# Patient Record
Sex: FEMALE | Race: BLACK OR AFRICAN AMERICAN | NOT HISPANIC OR LATINO | Employment: UNEMPLOYED | URBAN - NONMETROPOLITAN AREA
[De-identification: names, ages, dates, MRNs, and addresses within clinical notes are randomized per-mention and may not be internally consistent; named-entity substitution may affect disease eponyms.]

---

## 2021-05-18 LAB
BASOPHILS # BLD AUTO: 0.01 X10(3)/MCL (ref 0.01–0.08)
BASOPHILS NFR BLD AUTO: 0.3 % (ref 0.1–1.2)
BASOPHILS NFR BLD MANUAL: 0 % (ref 0–2)
BILIRUB SERPL-MCNC: NEGATIVE MG/DL
BLOOD URINE, POC: NEGATIVE
CLARITY, POC UA: CLEAR
COLOR, POC UA: YELLOW
EOSINOPHIL # BLD AUTO: 0.03 X10(3)/MCL (ref 0.04–0.36)
EOSINOPHIL NFR BLD AUTO: 0.8 % (ref 0.7–7)
EOSINOPHIL NFR BLD MANUAL: 0 % (ref 0–3)
ERYTHROCYTE [DISTWIDTH] IN BLOOD BY AUTOMATED COUNT: 14.2 % (ref 11–14.5)
GLUCOSE UR QL STRIP: NEGATIVE
GRANULOCYTES NFR BLD MANUAL: 51 % (ref 37–73)
HBV SURFACE AG SERPL QL IA: NEGATIVE
HCT VFR BLD AUTO: 36.2 % (ref 36–48)
HGB BLD-MCNC: 11.7 G/DL (ref 11.8–16)
HIV 1+2 AB+HIV1 P24 AG SERPL QL IA: NORMAL
IMM GRANULOCYTES # BLD AUTO: 0 X10E3/UL (ref 0–0.03)
IMM GRANULOCYTES NFR BLD AUTO: 0 % (ref 0–0.5)
KETONES UR QL STRIP: NEGATIVE
LEUKOCYTE EST, POC UA: NORMAL
LYMPHOCYTES # BLD AUTO: 1.67 X10(3)/MCL (ref 1.16–3.74)
LYMPHOCYTES NFR BLD AUTO: 42.3 % (ref 20–55)
LYMPHOCYTES NFR BLD MANUAL: 47 % (ref 20–55)
MCH RBC QN AUTO: 29.1 PG (ref 27–34)
MCHC RBC AUTO-ENTMCNC: 32.3 G/DL (ref 31–37)
MCV RBC AUTO: 90 FL (ref 79–99)
METAMYELOCYTES NFR BLD MANUAL: 0 % (ref 0–1)
MONOCYTES # BLD AUTO: 0.26 X10(3)/MCL (ref 0.24–0.36)
MONOCYTES NFR BLD AUTO: 6.6 % (ref 4.7–12.5)
MONOCYTES NFR BLD MANUAL: 2 % (ref 0–10)
MYELOCYTES NFR BLD MANUAL: 0 %
NEUTROPHILS # BLD AUTO: 1.98 X10(3)/MCL (ref 1.56–6.13)
NEUTROPHILS NFR BLD AUTO: 50 % (ref 37–73)
NEUTS BAND NFR BLD MANUAL: 0 % (ref 0–5)
NITRITE, POC UA: NEGATIVE
NRBC BLD MANUAL-RTO: 0 % (ref 0–1)
PH, POC UA: 7
PLATELET # BLD AUTO: 313 X10(3)/MCL (ref 140–371)
PMV BLD AUTO: 10.1 FL (ref 9.4–12.4)
POC BETA-HCG (QUAL): POSITIVE
PROTEIN, POC: NEGATIVE
RBC # BLD AUTO: 4.02 X10(6)/MCL (ref 4–5.1)
RPR SER QL: NORMAL
SPECIFIC GRAVITY, POC UA: 1.02
UROBILINOGEN, POC UA: NORMAL
WBC # SPEC AUTO: 4 X10(3)/MCL (ref 4–11.5)

## 2021-05-19 LAB
C TRACH DNA SPEC QL NAA+PROBE: NEGATIVE
N GONNORRH DNA PROBE W/RFLX: NEGATIVE
PAP RECOMMENDATION EXT: NORMAL

## 2021-06-15 LAB
CLARITY, POC UA: CLEAR
COLOR, POC UA: YELLOW
GLUCOSE UR QL STRIP: NEGATIVE
LEUKOCYTE EST, POC UA: NEGATIVE
NITRITE, POC UA: NEGATIVE
PROTEIN, POC: NEGATIVE

## 2021-08-18 ENCOUNTER — HISTORICAL (OUTPATIENT)
Dept: ADMINISTRATIVE | Facility: HOSPITAL | Age: 26
End: 2021-08-18

## 2021-08-27 LAB
BILIRUB SERPL-MCNC: NEGATIVE MG/DL
BLOOD URINE, POC: NEGATIVE
CLARITY, POC UA: CLEAR
COLOR, POC UA: YELLOW
GLUCOSE UR QL STRIP: NEGATIVE
KETONES UR QL STRIP: NEGATIVE
LEUKOCYTE EST, POC UA: NEGATIVE
NITRITE, POC UA: NEGATIVE
PH, POC UA: 7
PROTEIN, POC: NEGATIVE
SPECIFIC GRAVITY, POC UA: 1.02
UROBILINOGEN, POC UA: NORMAL

## 2021-09-22 LAB — GLUCOSE 1H P 100 G GLC PO SERPL-MCNC: 104 MG/DL (ref 70–140)

## 2021-10-19 LAB
CLARITY, POC UA: CLEAR
COLOR, POC UA: YELLOW
ERYTHROCYTE [DISTWIDTH] IN BLOOD BY AUTOMATED COUNT: 13.1 % (ref 11–14.5)
GLUCOSE UR QL STRIP: NEGATIVE
HCT VFR BLD AUTO: 28 % (ref 36–48)
HGB BLD-MCNC: 9.4 G/DL (ref 11.8–16)
LEUKOCYTE EST, POC UA: NEGATIVE
MCH RBC QN AUTO: 30.8 PG (ref 27–34)
MCHC RBC AUTO-ENTMCNC: 33.6 G/DL (ref 31–37)
MCV RBC AUTO: 91.8 FL (ref 79–99)
NITRITE, POC UA: NEGATIVE
PLATELET # BLD AUTO: 291 X10(3)/MCL (ref 140–371)
PMV BLD AUTO: 9.5 FL (ref 9.4–12.4)
PROTEIN, POC: NEGATIVE
RBC # BLD AUTO: 3.05 X10(6)/MCL (ref 4–5.1)
RPR SER QL: NORMAL
WBC # SPEC AUTO: 6.9 X10(3)/MCL (ref 4–11.5)

## 2021-11-30 LAB
CLARITY, POC UA: CLEAR
COLOR, POC UA: NORMAL
ERYTHROCYTE [DISTWIDTH] IN BLOOD BY AUTOMATED COUNT: 13.5 % (ref 11–14.5)
GLUCOSE UR QL STRIP: NEGATIVE
HCT VFR BLD AUTO: 29.2 % (ref 36–48)
HGB BLD-MCNC: 9.7 G/DL (ref 11.8–16)
LEUKOCYTE EST, POC UA: NEGATIVE
MCH RBC QN AUTO: 30.8 PG (ref 27–34)
MCHC RBC AUTO-ENTMCNC: 33.2 G/DL (ref 31–37)
MCV RBC AUTO: 92.7 FL (ref 79–99)
NITRITE, POC UA: NEGATIVE
PLATELET # BLD AUTO: 238 10*3/UL (ref 140–371)
PMV BLD AUTO: 9.3 FL (ref 9.4–12.4)
PROTEIN, POC: NEGATIVE
RBC # BLD AUTO: 3.15 10*6/UL (ref 4–5.1)
WBC # SPEC AUTO: 5.7 10*3/UL (ref 4–11.5)

## 2022-04-10 ENCOUNTER — HISTORICAL (OUTPATIENT)
Dept: ADMINISTRATIVE | Facility: HOSPITAL | Age: 27
End: 2022-04-10

## 2022-04-25 VITALS
WEIGHT: 171.31 LBS | SYSTOLIC BLOOD PRESSURE: 120 MMHG | HEIGHT: 65 IN | DIASTOLIC BLOOD PRESSURE: 68 MMHG | BODY MASS INDEX: 28.54 KG/M2

## 2022-05-03 ENCOUNTER — HISTORICAL (OUTPATIENT)
Dept: ADMINISTRATIVE | Facility: HOSPITAL | Age: 27
End: 2022-05-03

## 2022-05-03 NOTE — HISTORICAL [facility] C[facility]ER
This is a historical note converted from Reynaldo. Formatting and pictures may have been removed.  Please reference Reynaldo for original formatting and attached multimedia. Chief Complaint  New OB LMP 3/8/21.  History of Present Illness  24yo G0?female here for New OB visit. Reports LMP: 3/8/21. No h/o pap smear before. No h/o any sexually transmitted dx.  LMP/EGA/LAURA  Gestational Age (EGA) and LAURA?? ? * Note: EGA calculated as of 05/18/2021  ?  LAURA:?01/07/2022???EGA*:?6 weeks 4 days ? ? ? ? ? ?Type:?Final??Method Date:?05/18/2021  ?  ?? ? ?Method:?Ultrasound?(05/18/2021)  ?? ? ?Confirmation:?Confirmed  ?? ? ?Description:?--  ?? ? ?Comments:?--  ?? ? ?Entered by:?Calvin BERTRAND, Hillary ALMENDAREZ on 05/18/2021?  ?  Other LAURA Calculations for this Pregnancy:  ?  ?? ? ?Method:?Last Menstrual Period  ?? ? ?Method Date:?03/08/2021  ?? ? ?LAURA:?12/13/2021  ?? ? ?EGA (At Entry):?10 weeks 1 days  ?? ? ?Type:?Non-Authoritative  ?? ? ?Comments:?--  ?? ? ?Entered by:?Cecily Rowan LPN on 05/18/2021  Gynecological History  Menstrual Status Intake: Due to pregnancy  Age of Menarche: 14  STIs/STDs: No  Abnormal Pap: No  HPV Vaccine: No  Dyspareunia: No  Postcoital Bleeding: No  Dysuria: No  Additional GYN Information: PAP no history  Discharge OB: Denies  Urinary Incontinence: Denies  Sexually Active: Yes  Review of Systems  General/Constitutional:?  Chills?denies. Fatigue/weakness?denies?. Fever?denies?. Night sweats?denies?.  Skin:  Rash?denies.  Respiratory:  Cough?denies?. Hemoptysis?denies?. SOB?denies?. Sputum production?denies?. Wheezing?denies?.  Cardiovascular:  Chest pain?denies. Dizziness?denies. Palpitations?denies. Swelling in hands/feet?denies.  Breast:  Changes in skin of nipple or breast?denies?. Breast lump?denies. Breast pain?denies. Nipple discharge?denies?.  Gastrointestinal:  Abdominal pain?denies?. Blood in stool?denies?. Constipation?denies?. Diarrhea?denies?. Heartburn?denies?. Nausea?denies?.  Vomiting?deniesGenitourinary:  Incontinence?denies?. Blood in urine?denies. Frequent urination?denies?. Painful urination?denies.  Gyneocologic:  Irregular menses?denies. Heavy bleeding?denies. Painful menses?denies. Vaginal discharge/ Odor?denies?. Vaginal lesion/pain?denies. ?Pelvic pain?denies?. Decreased libido?denies. Vulvar lesion/ulcer?denies?. Prolapse of genital organs?denies?. Painful intercourse?denies?.  Menopausal:  Hot flushes?denies. Vaginal dryness?denies.  Musculoskeletal:  Joint/tamy pain?denies. Decreased ROM?denies. Muscle aches?denies. Swollen joints?denies?. Weakness?denies.  Neurologic:  Confusion?denies. Trouble walking?denies. Trouble with balance?denies?Balance difficulty?denies. Gait abnormalities?denies. Headache?denies. Tingling/Numbness?denies.  Psychiatric:  Mood lability?denies.?Anxiety or panic attacks?denies. Depressed mood?denies. Suicidal thoughts?denies.?  Physical Exam  Vitals & Measurements  T:?36.5? ?C (Temporal Artery)? BP:?118/72?  HT:?160.00?cm? WT:?62.300?kg? BMI:?24.34?  Chaperone: present  ?   General appearance: - healthy, well-nourished and well-developed  ?   Psychiatric: Orientation to time, place and person. Normal mood and affect and active, alert  ?   Skin:  Appearance: no rashes or lesions  ?   Neck:  Neck: supple, FROM, trachea midline. and no masses  Thyroid: no enlargement or nodules and non-tender.  ?  ?   Cardiovascular:  Auscultation: RRR and no murmur  Peripheral Vascular: no varicosities, LLE edema, RLE edema, calf tenderness, and palpable cord and pedal pulses intact  ?   Lungs:  Respiratory effort: no intercostal retractions or accessory muscle usage  Auscultation: no wheezing, rales/crackles, or rhonchi and clear to auscultation  ?  Breast:  Inspection/Palpation: no tenderness, discrete/distinct masses, skin changes, or abnormal secretions. Nipple appearance normal.  ?  Abdomen:  Auscultation/Inspection/Palpation: no hepatomegaly, splenomegaly, masses ,  tenderness? or CVA tenderness and soft, non distended bowel sounds preset  Hernia: no palpable hernias  ?  Female Genitalia:  Vulva: no masses,?tenderness or?lesions  Bladder/Urethra: no urethral discharge or mass, normal meatus, bladder non-distended  Vagina: no tenderness, erythema, cystocele, rectocele, abnormal vaginal discharge, or vesicle(s) or ulcers  Cervix: no discharge , no cervical lacerations noted or motion tenderness and grossly normal  Uterus: normal size and shape and midline, non-tender, and no uterine prolapse.  Adnexa/Parametria: no parametrial tenderness or mass, no adnexal tenderness or ovarian mass  ?  Lymph Nodes:  Palpation: non tender submandibular nodes, axillary nodes, or inguinal nodes  ?  Rectal Exam:  Rectum: normal perianal skin  Assessment/Plan  1.?Encounter for supervision of normal pregnancy?Z34.90  2.?6 weeks gestation of pregnancy?Z3A.01  - We discussed diet and supplements and modifiable risk factors.  Patient advised to continue moderate physical activity.  Patient will report unusual headaches, visual disturbances, pelvic pain or cramping, vaginal bleeding, rupture of membranes, extreme swelling in hands or face, diminished urine volume, any prolonged illness or infection, or persistent symptoms of labor.  ?   - Discussed with patient on?the uncertainty of?COVID 19?in relation to?pregnancy?complications with patient and unborn fetus.? Advised to CDC guidelines of social distancing ,patient to stay home as much as possible, and?limit contact with others.? Travel restrictions discussed. ?Patient to call office if she has?a temperature over 100.4?cough shortness of breath?or GI symptoms. Patient educated if?she feels she is in an emergency to call 911.?  ?   - Pap GC/CZ  - PNL  - PNV  - RTC in 4 weeks for OB check   OB History  Pregnancy History???(0,0,0,0)?? ??  ?  ??No previous pregnancies history have been recorded  Problem List/Past Medical History  Ongoing  6 weeks  gestation of pregnancy  Pregnant  Historical  No qualifying data  Medications  No active medications  Allergies  No Known Allergies  Social History  Abuse/Neglect  No, 05/18/2021  Alcohol  Never, 05/18/2021  Sexual  Sexually active: Yes. Number of current partners 1. Gender Identity Identifies as female. No, 05/18/2021  Substance Use  Never, 05/18/2021  Tobacco  Never (less than 100 in lifetime), N/A, 05/18/2021  Family History  Ovarian cancer: Negative: Mother and Sister.  Primary malignant neoplasm of breast: Negative: Mother and Sister.  Primary malignant neoplasm of colon: Negative: Mother, Father, Sister and Brother.  Primary malignant neoplasm of female genital organ: Negative: Mother and Sister.  Uterine cancer: Negative: Mother and Sister.  Health Maintenance  Health Maintenance  ???Pending?(in the next year)  ??? ??OverDue  ??? ? ? ?Alcohol Misuse Screening due??01/02/21??and every 1??year(s)  ??? ??Due?  ??? ? ? ?ADL Screening due??05/18/21??and every 1??year(s)  ??? ? ? ?Tetanus Vaccine due??05/18/21??and every 10??year(s)  ??? ??Due In Future?  ??? ? ? ?Obesity Screening not due until??01/01/22??and every 1??year(s)  ???Satisfied?(in the past 1 year)  ??? ??Satisfied?  ??? ? ? ?Blood Pressure Screening on??05/18/21.??Satisfied by Cecily Rowan LPN  ??? ? ? ?Body Mass Index Check on??05/18/21.??Satisfied by Cecily Rowan LPN  ??? ? ? ?Obesity Screening on??05/18/21.??Satisfied by Cecily Rowan LPN  ?

## 2022-05-03 NOTE — HISTORICAL [facility]
This is a historical note converted from Reynaldo. Formatting and pictures may have been removed.  Please reference Reynaldo for original formatting and attached multimedia. Chief Complaint  ob check 18w4d.  History of Present Illness  26yo BF  presents at?18w4d for routine OB check. Denies complaints. See OB card.?  LMP/EGA/LAURA  Gestational Age (EGA) and LAURA?? ? * Note: EGA calculated as of 08/10/2021  ?  LAURA:?2022???EGA*:?18 weeks 4 days ? ? ? ? ? ?Type:?Final??Method Date:?2021  ?  ?? ? ?Method:?Ultrasound?(2021)  ?? ? ?Confirmation:?Confirmed  ?? ? ?Description:?--  ?? ? ?Comments:?--  ?? ? ?Entered by:?Calvin BERTRAND, Hillary ALMENDAREZ on 2021?  ?  Other LAURA Calculations for this Pregnancy:  ?  ?? ? ?Method:?Last Menstrual Period  ?? ? ?Method Date:?2021  ?? ? ?LAURA:?2021  ?? ? ?EGA (At Entry):?10 weeks 1 days  ?? ? ?Type:?Non-Authoritative  ?? ? ?Comments:?--  ?? ? ?Entered by:?Cecily Rowan LPN on 2021  Gynecological History  Menstrual Status Intake: Due to pregnancy  Age of Menarche: 14  STIs/STDs: No  Abnormal Pap: No  HPV Vaccine: No  Dyspareunia: No  Postcoital Bleeding: No  Dysuria: No  Additional GYN Information: PAP no history  Discharge OB: Denies  Urinary Incontinence: Denies  Sexually Active: Yes  Review of Systems  General/Constitutional:  Fever?denies?.?  Skin:  Rash?denies.  Respiratory:  Cough?denies?. SOB?denies?. Wheezing?denies?.  Cardiovascular:  Chest pain?denies?. Dizziness?denies?.?Palpitations?denies?. Swelling in hands/feet?denies?.?  Gastrointestinal:  Abdominal pain?denies?. Constipation?denies?. Diarrhea?denies?. Heartburn?denies?. Nausea?denies?. Vomiting?denies?  Genitourinary:  Painful urination?denies.  Gynecologic:  Vaginal bleeding?denies?. Vaginal discharge?Normal. Leaking amniotic fluid?denies. ?Contractions?denies?  Psychiatric:  Depressed mood?denies. Suicidal thoughts?denies.?  Physical Exam  Vitals & Measurements  T:?36.5? ?C (Temporal  Artery)? BP:?120/60?  HT:?160.00?cm? WT:?65.000?kg? BMI:?25.39?  Gen: NAD  Abd: Gravid, NT  Ext: No CCE  FHT: 140?  Assessment/Plan  1.?UTI in pregnancy, antepartum?O23.40  - Urine culture(21) E Coli?  ?   ?- Asymptomatic/afebrile  ?   ?- Txed with Keflex 500 QID 21  ?   ?- Educated  ?   ?- Urine culture contaminant (21)  ?   ?- ANA M urine culture today  2.?Pregnant?Z33.1  3.?18 weeks gestation of pregnancy?Z3A.18  - Instructed on weight gain, healthy exercise, vitamins, avoidance of drugs tobacco and alcohol. Pain, fever, bleeding precautions.?  ?   - Discussed with patient travel precautions.  ?   - Discussed with patient on?the uncertainty of?COVID 19?in relation to?pregnancy?complications with patient and unborn fetus.? Advised to CDC guidelines of social distancing ,patient to stay home as much as possible, and?limit contact with others.? Travel restrictions discussed. ?Patient to call office if she has?a temperature over 100.4?cough shortness of breath?or GI symptoms. Patient educated if?she feels she is in an emergency to call 911.?  ?   - Anatomy US/Quad screen ordered  ?   - RTC in 4 weeks for OB check   OB History  Pregnancy History???(0,0,0,0)?? ??  ?  ??No previous pregnancies history have been recorded  Problem List/Past Medical History  Ongoing  Pregnant  Historical  No qualifying data  Medications  Prenatal AD oral tablet, 1 tab(s), Oral, Daily, 8 refills  Allergies  No Known Allergies  Social History  Abuse/Neglect  No, 2021  Alcohol  Never, 2021  Sexual  Sexually active: Yes. Number of current partners 1. Gender Identity Identifies as female. No, 2021  Substance Use  Never, 2021  Tobacco  Never (less than 100 in lifetime), N/A, 2021  Family History  Ovarian cancer: Negative: Mother and Sister.  Primary malignant neoplasm of breast: Negative: Mother and Sister.  Primary malignant neoplasm of colon: Negative: Mother, Father, Sister and Brother.  Primary  malignant neoplasm of female genital organ: Negative: Mother and Sister.  Uterine cancer: Negative: Mother and Sister.  Health Maintenance  Health Maintenance  ???Pending?(in the next year)  ??? ??OverDue  ??? ? ? ?Alcohol Misuse Screening due??01/02/21??and every 1??year(s)  ??? ??Due?  ??? ? ? ?ADL Screening due??08/10/21??and every 1??year(s)  ??? ? ? ?Diabetes Screening due??08/10/21??and every 3??year(s)  ??? ? ? ?Tetanus Vaccine due??08/10/21??and every 10??year(s)  ??? ??Due In Future?  ??? ? ? ?Obesity Screening not due until??01/01/22??and every 1??year(s)  ???Satisfied?(in the past 1 year)  ??? ??Satisfied?  ??? ? ? ?Blood Pressure Screening on??08/10/21.??Satisfied by Jeniffer Lei LPN  ??? ? ? ?Body Mass Index Check on??08/10/21.??Satisfied by Jeniffer Lei LPN  ??? ? ? ?Cervical Cancer Screening on??05/19/21.??Satisfied by Rakel Dial  ??? ? ? ?Depression Screening on??08/10/21.??Satisfied by Jeniffer Lei LPN  ??? ? ? ?Obesity Screening on??08/10/21.??Satisfied by Jeniffer Lei LPN  ??? ??Refused?  ??? ? ? ?Influenza Vaccine on??06/15/21.??Recorded by Cecily Rowan LPN??Reason: Patient Refuses  ?

## 2022-09-21 ENCOUNTER — HISTORICAL (OUTPATIENT)
Dept: ADMINISTRATIVE | Facility: HOSPITAL | Age: 27
End: 2022-09-21

## 2023-01-13 ENCOUNTER — DOCUMENTATION ONLY (OUTPATIENT)
Dept: ADMINISTRATIVE | Facility: HOSPITAL | Age: 28
End: 2023-01-13

## 2023-05-29 ENCOUNTER — OFFICE VISIT (OUTPATIENT)
Dept: OBSTETRICS AND GYNECOLOGY | Facility: CLINIC | Age: 28
End: 2023-05-29
Payer: COMMERCIAL

## 2023-05-29 VITALS
SYSTOLIC BLOOD PRESSURE: 110 MMHG | TEMPERATURE: 98 F | BODY MASS INDEX: 27.44 KG/M2 | WEIGHT: 164.69 LBS | DIASTOLIC BLOOD PRESSURE: 72 MMHG | HEIGHT: 65 IN

## 2023-05-29 DIAGNOSIS — Z12.4 CERVICAL CANCER SCREENING: Primary | ICD-10-CM

## 2023-05-29 DIAGNOSIS — N89.8 VAGINAL ODOR: ICD-10-CM

## 2023-05-29 DIAGNOSIS — Z97.5 IUD (INTRAUTERINE DEVICE) IN PLACE: ICD-10-CM

## 2023-05-29 DIAGNOSIS — Z01.419 WELL WOMAN EXAM: ICD-10-CM

## 2023-05-29 PROCEDURE — 3008F BODY MASS INDEX DOCD: CPT | Mod: CPTII,,, | Performed by: OBSTETRICS & GYNECOLOGY

## 2023-05-29 PROCEDURE — 1160F RVW MEDS BY RX/DR IN RCRD: CPT | Mod: CPTII,,, | Performed by: OBSTETRICS & GYNECOLOGY

## 2023-05-29 PROCEDURE — 1160F PR REVIEW ALL MEDS BY PRESCRIBER/CLIN PHARMACIST DOCUMENTED: ICD-10-PCS | Mod: CPTII,,, | Performed by: OBSTETRICS & GYNECOLOGY

## 2023-05-29 PROCEDURE — 3074F SYST BP LT 130 MM HG: CPT | Mod: CPTII,,, | Performed by: OBSTETRICS & GYNECOLOGY

## 2023-05-29 PROCEDURE — 3078F DIAST BP <80 MM HG: CPT | Mod: CPTII,,, | Performed by: OBSTETRICS & GYNECOLOGY

## 2023-05-29 PROCEDURE — 3078F PR MOST RECENT DIASTOLIC BLOOD PRESSURE < 80 MM HG: ICD-10-PCS | Mod: CPTII,,, | Performed by: OBSTETRICS & GYNECOLOGY

## 2023-05-29 PROCEDURE — 3074F PR MOST RECENT SYSTOLIC BLOOD PRESSURE < 130 MM HG: ICD-10-PCS | Mod: CPTII,,, | Performed by: OBSTETRICS & GYNECOLOGY

## 2023-05-29 PROCEDURE — 3008F PR BODY MASS INDEX (BMI) DOCUMENTED: ICD-10-PCS | Mod: CPTII,,, | Performed by: OBSTETRICS & GYNECOLOGY

## 2023-05-29 PROCEDURE — 1159F MED LIST DOCD IN RCRD: CPT | Mod: CPTII,,, | Performed by: OBSTETRICS & GYNECOLOGY

## 2023-05-29 PROCEDURE — 99395 PREV VISIT EST AGE 18-39: CPT | Mod: ,,, | Performed by: OBSTETRICS & GYNECOLOGY

## 2023-05-29 PROCEDURE — 1159F PR MEDICATION LIST DOCUMENTED IN MEDICAL RECORD: ICD-10-PCS | Mod: CPTII,,, | Performed by: OBSTETRICS & GYNECOLOGY

## 2023-05-29 PROCEDURE — 99395 PR PREVENTIVE VISIT,EST,18-39: ICD-10-PCS | Mod: ,,, | Performed by: OBSTETRICS & GYNECOLOGY

## 2023-05-29 RX ORDER — LEVONORGESTREL 52 MG/1
52 INTRAUTERINE DEVICE INTRAUTERINE
COMMUNITY
Start: 2022-06-15

## 2023-05-29 NOTE — PROGRESS NOTES
Chief Complaint:  Well Woman    History of Present Illness:  Summer Ng is a 27 y.o. year old  presents for her well woman. Currently using Mirena IUD for birth control, placed 22. Patient has monthly cycles with heavy flow lasting 8-9 days. Occasional vaginal discharge with odor. None today.      LMP: 23  Frequency: q month    Cycle Length: 8-9 days   Flow: heavy  Intermenstrual Bleeding: No  Postcoital Bleeding: No  Dysmenorrhea: Yes, moderate   Sexually Active: Yes    Dyspareunia: No  Contraception: Mirena 22 inserted    H/o STI: No   Last pap: 21 nml GC/CZ Negative   H/o Abnormal Pap: No   Colposcopy: No  Gardasil: No hx    MMG: n/a      Review of Systems:  General/Constitutional: Chills denies. Fatigue/weakness denies. Fever denies. Night sweats denies. Hot flashes denies    Respiratory: Cough denies. Hemoptysis denies. SOB denies. Sputum production denies. Wheezing denies .   Cardiovascular: Chest pain denies . Dizziness denies. Palpitations denies. Swelling in hands/feet denies    Gastrointestinal: Abdominal pain denies. Blood in stool denies. Constipation denies. Diarrhea denies. Heartburn denies. Nausea denies. Vomiting denies    Genitourinary: Incontinence denies. Blood in urine denies. Frequent urination denies. Painful urination denies. Urinary urgency denies. Nocturia denies    Gynecologic: Irregular menses denies. Heavy bleeding denies. Painful menses denies. Vaginal discharge denies. Vaginal odor denies. Vaginal itching denies. Vaginal lesion denies. Pelvic pain denies. Decreased libido denies. Vulvar lesion denies. Prolapse of genital organs denies. Painful intercourse denies. Postcoital bleeding denies    Psychiatric: Depression denies. Anxiety denies     OB History    Para Term  AB Living   1 1 1 0 0 1   SAB IAB Ectopic Multiple Live Births   0 0 0 0 1      # 1 - Date: 21, Sex: Female, Weight: 3.459 kg (7 lb 10 oz), GA: 38w0d, Delivery: ,  "Unspecified, Apgar1: None, Apgar5: None, Living: Living, Birth Comments: None      History reviewed. No pertinent past medical history.      Current Outpatient Medications:     levonorgestreL (MIRENA) 21 mcg/24 hours (8 yrs) 52 mg IUD, 1 each by Intrauterine route once., Disp: , Rfl:     levonorgestreL (MIRENA) 21 mcg/24 hours (8 yrs) 52 mg IUD, 52 mg by Intrauterine route., Disp: , Rfl:     Review of patient's allergies indicates:  No Known Allergies    Past Surgical History:   Procedure Laterality Date     SECTION  2021     Family History   Problem Relation Age of Onset    Breast cancer Neg Hx     Colon cancer Neg Hx     Ovarian cancer Neg Hx     Uterine cancer Neg Hx     Cervical cancer Neg Hx      Social History     Socioeconomic History    Marital status:    Tobacco Use    Smoking status: Never    Smokeless tobacco: Never   Substance and Sexual Activity    Alcohol use: Never    Drug use: Never    Sexual activity: Yes     Partners: Male     Birth control/protection: I.U.D.       Physical Exam:  /72 (BP Location: Right arm)   Temp 98.1 °F (36.7 °C)   Ht 5' 4.96" (1.65 m)   Wt 74.7 kg (164 lb 10.9 oz)   LMP 2023 (Exact Date)   BMI 27.44 kg/m²     Chaperone: present.     General appearance: healthy, well-nourished and well-developed     Psychiatric: Orientation to time, place and person. Normal mood and affect and active, alert     Skin: Appearance: no rashes or lesions.     Neck:   Neck: supple, FROM, trachea midline. and no masses   Thyroid: no enlargement or nodules and non-tender.       Cardiovascular:   Auscultation: RRR and no murmur.   Peripheral Vascular: no varicosities, LLE edema, RLE edema, calf tenderness, and palpable cord and pedal pulses intact.     Lungs:   Respiratory effort: no intercostal retractions or accessory muscle usage.   Auscultation: no wheezing, rales/crackles, or rhonchi and clear to auscultation.     Breast:   Inspection/Palpation: no tenderness, " discrete/distinct masses, skin changes, or abnormal secretions. Nipple appearance normal.     Abdomen:   Auscultation/Inspection/Palpation: no hepatomegaly, splenomegaly, masses, tenderness or CVA tenderness and soft, non-distended bowel sounds preset.    Hernia: no palpable hernias.     Female Genitalia:    Vulva: no masses, tenderness or lesions    Bladder/Urethra: no urethral discharge or mass, normal meatus, bladder non-distended.    Vagina: no tenderness, erythema, cystocele, rectocele, abnormal vaginal discharge or vesicle(s) or ulcers    Cervix: no discharge, no cervical lacerations noted or motion tenderness and grossly normal; IUD strings noted    Uterus: normal size and shape and midline, non-tender, and no uterine prolapse.    Adnexa/Parametria: no parametrial tenderness or mass, no adnexal tenderness or ovarian mass.     Lymph Nodes:   Palpation: non tender submandibular nodes, axillary nodes, or inguinal nodes.     Rectal Exam:   Rectum: normal perianal skin.       Assessment/Plan:  1. Well woman exam  Pap gc/cz/tv  Advised patient if she notices any changes to her breasts, including a lump, mass, dimpling, discharge, rash or tenderness, to should contact us immediately   Healthy diet, exercise   Multivitamin   Seat belt   Sunscreen use   Safe sex/ STI education   Contraception evaluation: Mirena IUD, placed 5/19/22   Gardasil evaluation: no history. Educated. Pt declines    2. IUD (intrauterine device) in place  Normal exam; strings noted on cervix    Educated on bleeding patterns with IUD    Gave bleeding precautions    Discussed with patient that birth control does not protect against STIs    Safe sex education    3. Vaginal odor  AVOID: Scented soaps or shampoos, bubble bath, scented detergents, feminine sprays, douches, powders          Fragrance-free pH neutral soap     Unscented detergents     On cycle    Cultures: gc/cz/tv/BV

## 2023-06-01 LAB — PSYCHE PATHOLOGY RESULT: NORMAL

## 2024-06-05 NOTE — PROGRESS NOTES
Chief Complaint:  Well Woman    History of Present Illness:  Summer Ng is a 28 y.o. year old  presents for her well woman exam. Currently relying on Mirena for birth control, placed 22. Has not had a cycle since April. Content, desires to continue.      Gyn History:  Menstrual History   Cycle: Yes (2024 per pt)  Menarche Age: 14 years  Flow Duration: 7  Flow: Light  Interval: 28 (irregular)  Intermenstrual Bleeding: No  Dysmenorrhea: No  [employer]  Sexually Active: Yes  Sexual Orientation: heterosexual  Postcoital Bleeding: No  Dyspareunia: No  STI History: No  Contraception: Yes  Contraception Type: IUD (inserted 2022)  Menopause  Menopause Age: 2 years  Breast History  Last Breast Imaging Date: No  History of Breast Biopsy: No  Pap History   Last pap date: 23  Result: Normal  History of Abnormal Pap: No  HPV Vaccine Completed: No      Review of Systems:  General/Constitutional: Chills denies. Fatigue/weakness denies. Fever denies. Night sweats denies. Hot flashes denies    Respiratory: Cough denies. Hemoptysis denies. SOB denies. Sputum production denies. Wheezing denies .   Cardiovascular: Chest pain denies. Dizziness denies. Palpitations denies. Swelling in hands/feet denies.                Breast: Dimpling denies. Breast mass denies. Breast pain/tenderness denies. Nipple discharge denies. Puckering denies.  Gastrointestinal: Abdominal pain denies. Blood in stool denies. Constipation denies. Diarrhea denies. Heartburn denies. Nausea denies. Vomiting denies    Genitourinary: Incontinence denies. Blood in urine denies. Frequent urination denies. Painful urination denies. Urinary urgency denies. Nocturia denies    Gynecologic: Irregular menses denies. Heavy bleeding denies. Painful menses denies. Vaginal discharge denies. Vaginal odor denies. Vaginal itching denies. Vaginal lesion denies. Pelvic pain denies. Decreased libido denies. Vulvar lesion denies. Prolapse of genital organs  "denies. Painful intercourse denies. Postcoital bleeding denies    Psychiatric: Depression denies. Anxiety denies.     OB History    Para Term  AB Living   1 1 1 0 0 1   SAB IAB Ectopic Multiple Live Births   0 0 0 0 1      # 1 - Date: 21, Sex: Female, Weight: 3.459 kg (7 lb 10 oz), GA: 38w0d, Type: , Unspecified, Apgar1: None, Apgar5: None, Living: Living, Birth Comments: None      History reviewed. No pertinent past medical history.    Current Outpatient Medications:     levonorgestreL (MIRENA) 21 mcg/24 hours (8 yrs) 52 mg IUD, 52 mg by Intrauterine route., Disp: , Rfl:     Review of patient's allergies indicates:  No Known Allergies    Past Surgical History:   Procedure Laterality Date     SECTION  2021     Family History   Problem Relation Name Age of Onset    Hypertension Mother Rita Kay     Breast cancer Neg Hx      Colon cancer Neg Hx      Ovarian cancer Neg Hx      Uterine cancer Neg Hx      Cervical cancer Neg Hx       Social History     Socioeconomic History    Marital status:    Tobacco Use    Smoking status: Never     Passive exposure: Never    Smokeless tobacco: Never   Substance and Sexual Activity    Alcohol use: Never    Drug use: Never    Sexual activity: Yes     Partners: Male     Birth control/protection: I.U.D.       Physical Exam:  /74 (BP Location: Left arm, Patient Position: Sitting, BP Method: Medium (Manual))   Temp 97 °F (36.1 °C) (Temporal)   Ht 5' 2" (1.575 m)   Wt 75 kg (165 lb 6.4 oz)   LMP  (LMP Unknown)   BMI 30.25 kg/m²     Chaperone: present.     General appearance: healthy, well-nourished and well-developed     Psychiatric: Orientation to time, place and person. Normal mood and affect and active, alert     Skin: Appearance: no rashes or lesions.     Neck:   Neck: supple, FROM, trachea midline. and no masses   Thyroid: no enlargement or nodules and non-tender.       Cardiovascular:   Auscultation: RRR and no murmur. "   Peripheral Vascular: no varicosities, LLE edema, RLE edema, calf tenderness, and palpable cord and pedal pulses intact.     Lungs:   Respiratory effort: no intercostal retractions or accessory muscle usage.   Auscultation: no wheezing, rales/crackles, or rhonchi and clear to auscultation.     Breast:   Inspection/Palpation: no tenderness, discrete/distinct masses, skin changes, or abnormal secretions. Nipple appearance normal.     Abdomen:   Auscultation/Inspection/Palpation: no hepatomegaly, splenomegaly, masses, tenderness or CVA tenderness and soft, non-distended bowel sounds preset.    Hernia: no palpable hernias.     Female Genitalia:    Vulva: no masses, tenderness or lesions    Bladder/Urethra: no urethral discharge or mass, normal meatus, bladder non-distended.    Vagina: no tenderness, erythema, cystocele, rectocele, abnormal vaginal discharge or vesicle(s) or ulcers    Cervix: no discharge, no cervical lacerations noted or motion tenderness and grossly normal; +IUD strings in place   Uterus: normal size and shape and midline, non-tender, and no uterine prolapse.    Adnexa/Parametria: no parametrial tenderness or mass, no adnexal tenderness or ovarian mass.     Lymph Nodes:   Palpation: non tender submandibular nodes, axillary nodes, or inguinal nodes.     Rectal Exam:   Rectum: normal perianal skin.       Assessment/Plan:  1. Well woman exam  Pap  Recommend BSE monthly  Discussed recommendations of annual screening after age of 40 with mammogram    Explained that screening is not 100% reliable. Advised patient if she notices any changes to her breast including a lump, mass, dimpling, discharge, rash, or tenderness she should contact us immediately.     Healthy diet, exercise   Multivitamin   Seat belt   Sunscreen use   Safe sex/ STI education   Contraception evaluation: Mirena 5/19/22  Gardasil evaluation: 0/3    2. IUD (intrauterine device) in place  Normal exam; strings noted on cervix  Educated on  bleeding patterns with Mirena IUD  Gave bleeding precautions  Discussed with patient that birth control does not protect against STIs  Safe sex education  Educated no longer effective after 8 years  UPT       This note was transcribed by Nadia Hayes MA. There may be transcription errors as a result, however minimal. Effort has been made to ensure accuracy of transcription, but any obvious errors or omissions should be clarified with the author of the document.

## 2024-06-06 ENCOUNTER — OFFICE VISIT (OUTPATIENT)
Dept: OBSTETRICS AND GYNECOLOGY | Facility: CLINIC | Age: 29
End: 2024-06-06
Payer: COMMERCIAL

## 2024-06-06 VITALS
DIASTOLIC BLOOD PRESSURE: 74 MMHG | BODY MASS INDEX: 30.44 KG/M2 | HEIGHT: 62 IN | SYSTOLIC BLOOD PRESSURE: 124 MMHG | TEMPERATURE: 97 F | WEIGHT: 165.38 LBS

## 2024-06-06 DIAGNOSIS — Z97.5 IUD (INTRAUTERINE DEVICE) IN PLACE: ICD-10-CM

## 2024-06-06 DIAGNOSIS — Z12.4 CERVICAL CANCER SCREENING: ICD-10-CM

## 2024-06-06 DIAGNOSIS — Z01.419 WELL WOMAN EXAM: Primary | ICD-10-CM

## 2024-06-06 PROCEDURE — 1159F MED LIST DOCD IN RCRD: CPT | Mod: CPTII,,, | Performed by: OBSTETRICS & GYNECOLOGY

## 2024-06-06 PROCEDURE — 3074F SYST BP LT 130 MM HG: CPT | Mod: CPTII,,, | Performed by: OBSTETRICS & GYNECOLOGY

## 2024-06-06 PROCEDURE — 99395 PREV VISIT EST AGE 18-39: CPT | Mod: ,,, | Performed by: OBSTETRICS & GYNECOLOGY

## 2024-06-06 PROCEDURE — 3078F DIAST BP <80 MM HG: CPT | Mod: CPTII,,, | Performed by: OBSTETRICS & GYNECOLOGY

## 2024-06-06 PROCEDURE — 87491 CHLMYD TRACH DNA AMP PROBE: CPT | Performed by: OBSTETRICS & GYNECOLOGY

## 2024-06-06 PROCEDURE — 3008F BODY MASS INDEX DOCD: CPT | Mod: CPTII,,, | Performed by: OBSTETRICS & GYNECOLOGY

## 2024-06-06 PROCEDURE — 87661 TRICHOMONAS VAGINALIS AMPLIF: CPT | Performed by: OBSTETRICS & GYNECOLOGY

## 2024-06-06 PROCEDURE — 87591 N.GONORRHOEAE DNA AMP PROB: CPT | Performed by: OBSTETRICS & GYNECOLOGY

## 2024-06-11 LAB
CHLAMYDIA TRACHOMATIS: NEGATIVE
NEISSERIA GONORRHOEAE: NEGATIVE
PSYCHE PATHOLOGY RESULT: NORMAL
TRICHOMONAS VAGINALIS: NEGATIVE

## 2024-10-30 ENCOUNTER — HOSPITAL ENCOUNTER (EMERGENCY)
Facility: HOSPITAL | Age: 29
Discharge: HOME | End: 2024-10-30
Attending: FAMILY MEDICINE
Payer: COMMERCIAL

## 2024-10-30 VITALS
DIASTOLIC BLOOD PRESSURE: 73 MMHG | OXYGEN SATURATION: 95 % | RESPIRATION RATE: 18 BRPM | TEMPERATURE: 97 F | HEART RATE: 95 BPM | WEIGHT: 142 LBS | BODY MASS INDEX: 26.13 KG/M2 | SYSTOLIC BLOOD PRESSURE: 121 MMHG | HEIGHT: 62 IN

## 2024-10-30 DIAGNOSIS — S16.1XXA CERVICAL STRAIN, ACUTE, INITIAL ENCOUNTER: ICD-10-CM

## 2024-10-30 DIAGNOSIS — S00.03XA CONTUSION OF SCALP, INITIAL ENCOUNTER: ICD-10-CM
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
## 2024-10-30 PROCEDURE — 99284 EMERGENCY DEPT VISIT MOD MDM: CPT

## 2024-10-30 RX ORDER — KETOROLAC TROMETHAMINE 10 MG/1
10 TABLET, FILM COATED ORAL EVERY 6 HOURS
Qty: 15 TABLET | Refills: 0 | Status: SHIPPED | OUTPATIENT
Start: 2024-10-30 | End: 2024-11-04

## 2024-10-30 RX ORDER — METHOCARBAMOL 750 MG/1
750 TABLET, FILM COATED ORAL 3 TIMES DAILY
Qty: 30 TABLET | Refills: 0 | Status: SHIPPED | OUTPATIENT
Start: 2024-10-30 | End: 2024-11-09
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
## 2025-06-06 NOTE — PROGRESS NOTES
Chief Complaint: Annual exam           HPI:    29 y.o. F  presents for an annual gyn exam. Currently relying on Mirena IUD, inserted 2022 for contraception. No cycle due to IUD. Would like to discuss removal of IUD.      Cancer-related family history is negative for Breast cancer, Ovarian cancer, Uterine cancer, and Cervical cancer.       Labs / Significant Studies:  Gyn History:    Menstrual History  Cycle: Yes  Menarche Age: 14 years  Flow Duration: 7  Flow: Normal  Interval: 28  Intermenstrual Bleeding: No  Dysmenorrhea: No    Menopause  Menopause Age: 2 years    Pap History  Last pap date: 24 (NIL -GC CZ TV)  Result: Normal  History of Abnormal Pap: No  HPV Vaccine Completed: No    [place]  Sexually Active: Yes  Sexual Orientation: heterosexual  Postcoital Bleeding: No  Dyspareunia: No  STI History: No  Contraception: Yes  Contraception Type: IUD (Mirena inserted 22)    Breast History  Last Breast Imaging Date: No  History of Breast Biopsy: No    Family History   Problem Relation Name Age of Onset    Hypertension Mother Hadjo Rahul     Breast cancer Neg Hx      Colon cancer Neg Hx      Ovarian cancer Neg Hx      Uterine cancer Neg Hx      Cervical cancer Neg Hx           History reviewed. No pertinent past medical history.  Past Surgical History:   Procedure Laterality Date     SECTION  2021     Current Medications[1]    Review of patient's allergies indicates:  No Known Allergies    Social History[2]    Review of Systems:  General/Constitutional: Chills denies. Fatigue/weakness denies. Fever denies. Night sweats denies. Hot flashes denies    Respiratory: Cough denies. Hemoptysis denies. SOB denies. Sputum production denies. Wheezing denies .   Cardiovascular: Chest pain denies . Dizziness denies. Palpitations denies. Swelling in hands/feet denies    Gastrointestinal: Abdominal pain denies. Blood in stool denies. Constipation denies. Diarrhea denies. Heartburn denies.  "Nausea denies. Vomiting denies    Genitourinary: Incontinence denies. Blood in urine denies. Frequent urination denies. Painful urination denies. Urinary urgency denies. Nocturia denies    Gynecologic: Irregular menses denies. Heavy bleeding denies. Painful menses denies. Vaginal discharge denies. Vaginal odor denies. Vaginal itching denies. Vaginal lesion denies. Pelvic pain denies. Decreased libido denies. Vulvar lesion denies. Prolapse of genital organs denies. Painful intercourse denies. Postcoital bleeding denies    Psychiatric: Depression denies. Anxiety denies     Physical Exam:   Vitals:    06/11/25 1333   BP: 118/68   BP Location: Right arm   Patient Position: Sitting   Pulse: 78   Resp: 19   Weight: 57.2 kg (126 lb)   Height: 5' 2" (1.575 m)       Body mass index is 23.05 kg/m².       Chaperone: present.     General appearance: healthy, well-nourished and well-developed     Psychiatric: Orientation to time, place and person. Normal mood and affect and active, alert     Skin: Appearance: no rashes or lesions.     Neck:   Neck: supple, FROM, trachea midline. and no masses   Thyroid: no enlargement or nodules and non-tender.       Cardiovascular:   Auscultation: RRR and no murmur.   Peripheral Vascular: no varicosities, LLE edema, RLE edema, calf tenderness, and palpable cord and pedal pulses intact.     Lungs:   Respiratory effort: no intercostal retractions or accessory muscle usage.   Auscultation: no wheezing, rales/crackles, or rhonchi and clear to auscultation.     Breast:   Inspection/Palpation: no tenderness, discrete/distinct masses, skin changes, or abnormal secretions. Nipple appearance normal.     Abdomen:   Auscultation/Inspection/Palpation: no hepatomegaly, splenomegaly, masses, tenderness or CVA tenderness and soft, non-distended bowel sounds preset.    Hernia: no palpable hernias.     Female Genitalia:    Vulva: no masses, tenderness or lesions    Bladder/Urethra: no urethral discharge or " "mass, normal meatus, bladder non-distended.    Vagina: no tenderness, erythema, cystocele, rectocele, abnormal vaginal discharge or vesicle(s) or ulcers    Cervix: no discharge, no cervical lacerations noted or motion tenderness and grossly normal IUD STRINGS PRESENT   Uterus: normal size and shape and midline, non-tender, and no uterine prolapse.    Adnexa/Parametria: no parametrial tenderness or mass, no adnexal tenderness or ovarian mass.     Lymph Nodes:   Palpation: non tender submandibular nodes, axillary nodes, or inguinal nodes.     Rectal Exam:   Rectum: normal perianal skin.     Chaperone present.              IUD Removal:  Pelvic examination was normal. Pap smear is current. Urine pregnancy test negative.  With the patient in the dorsal lithotomy position, a speculum was placed into vagina.  Strings were not visible and Crowley Currett was used to "tease" them into the cervical os.  The strings were easily grasped with: Ring Forceps.  The Mirena IUD was removed and shown to the patient.  Patient tolerated procedure well.     Assessment:     Plan:    Fati was seen today for well woman.    Diagnoses and all orders for this visit:    Well woman exam  Cervical cancer screening  Pap  Recommend BSE monthly  Discussed recommendations of annual screening after age of 40 with mammogram    Explained that screening is not 100% reliable. Advised patient if she notices any changes to her breast including a lump, mass, dimpling, discharge, rash, or tenderness she should contact us immediately.     Healthy diet, exercise   Multivitamin   Seat belt   Sunscreen use   Safe sex/ STI education   Contraception evaluation: Mirena IUD inserted 05/19/2022  Gardasil evaluation: 0/3  RTC prn/annual     Encounter for IUD Removal  Patient tolerated well.     We discussed possible cramping and bleeding over the next few days.  Patient informed that return to fertility is immediate.  Patient encouraged to take PNV daily.      Future " Appointments   Date Time Provider Department Center   7/1/2025  1:00 PM Hillary Simpson MD [facility] [facility]     This note was transcribed by Clarisse Balderas. There may be transcription errors as a result, however minimal. Effort has been made to ensure accuracy of transcription, but any obvious errors or omissions should be clarified with the author of the document.              [1]   Current Outpatient Medications:     levonorgestreL (MIRENA) 21 mcg/24 hours (8 yrs) 52 mg IUD, 52 mg by Intrauterine route., Disp: , Rfl:   [2]   Social History  Tobacco Use    Smoking status: Never     Passive exposure: Never    Smokeless tobacco: Never   Substance Use Topics    Alcohol use: Never    Drug use: Never

## 2025-06-11 ENCOUNTER — OFFICE VISIT (OUTPATIENT)
Dept: OBSTETRICS AND GYNECOLOGY | Facility: CLINIC | Age: 30
End: 2025-06-11
Payer: COMMERCIAL

## 2025-06-11 VITALS
SYSTOLIC BLOOD PRESSURE: 118 MMHG | DIASTOLIC BLOOD PRESSURE: 68 MMHG | HEIGHT: 62 IN | WEIGHT: 126 LBS | RESPIRATION RATE: 19 BRPM | BODY MASS INDEX: 23.19 KG/M2 | HEART RATE: 78 BPM

## 2025-06-11 DIAGNOSIS — Z01.419 WELL WOMAN EXAM: Primary | ICD-10-CM

## 2025-06-11 DIAGNOSIS — Z30.432 ENCOUNTER FOR IUD REMOVAL: ICD-10-CM

## 2025-06-11 DIAGNOSIS — Z12.4 CERVICAL CANCER SCREENING: ICD-10-CM

## 2025-06-11 PROCEDURE — 3008F BODY MASS INDEX DOCD: CPT | Mod: CPTII,,, | Performed by: OBSTETRICS & GYNECOLOGY

## 2025-06-11 PROCEDURE — 58301 REMOVE INTRAUTERINE DEVICE: CPT | Mod: ,,, | Performed by: OBSTETRICS & GYNECOLOGY

## 2025-06-11 PROCEDURE — 3074F SYST BP LT 130 MM HG: CPT | Mod: CPTII,,, | Performed by: OBSTETRICS & GYNECOLOGY

## 2025-06-11 PROCEDURE — 1159F MED LIST DOCD IN RCRD: CPT | Mod: CPTII,,, | Performed by: OBSTETRICS & GYNECOLOGY

## 2025-06-11 PROCEDURE — 99395 PREV VISIT EST AGE 18-39: CPT | Mod: 25,,, | Performed by: OBSTETRICS & GYNECOLOGY

## 2025-06-11 PROCEDURE — 3078F DIAST BP <80 MM HG: CPT | Mod: CPTII,,, | Performed by: OBSTETRICS & GYNECOLOGY
